# Patient Record
Sex: MALE | Race: WHITE | NOT HISPANIC OR LATINO
[De-identification: names, ages, dates, MRNs, and addresses within clinical notes are randomized per-mention and may not be internally consistent; named-entity substitution may affect disease eponyms.]

---
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
## 2021-08-15 DIAGNOSIS — Z01.818 ENCOUNTER FOR OTHER PREPROCEDURAL EXAMINATION: ICD-10-CM

## 2021-08-16 ENCOUNTER — APPOINTMENT (OUTPATIENT)
Dept: DISASTER EMERGENCY | Facility: CLINIC | Age: 21
End: 2021-08-16

## 2021-08-17 LAB — SARS-COV-2 N GENE NPH QL NAA+PROBE: NOT DETECTED

## 2022-04-07 ENCOUNTER — APPOINTMENT (OUTPATIENT)
Dept: FAMILY MEDICINE | Facility: CLINIC | Age: 22
End: 2022-04-07
Payer: MEDICAID

## 2022-04-07 VITALS
DIASTOLIC BLOOD PRESSURE: 75 MMHG | HEIGHT: 68 IN | SYSTOLIC BLOOD PRESSURE: 118 MMHG | BODY MASS INDEX: 19.25 KG/M2 | WEIGHT: 127 LBS | HEART RATE: 67 BPM | OXYGEN SATURATION: 99 % | TEMPERATURE: 97.6 F

## 2022-04-07 DIAGNOSIS — M67.88 OTHER SPECIFIED DISORDERS OF SYNOVIUM AND TENDON, OTHER SITE: ICD-10-CM

## 2022-04-07 PROCEDURE — 99395 PREV VISIT EST AGE 18-39: CPT

## 2022-04-07 NOTE — HISTORY OF PRESENT ILLNESS
[de-identified] : 23yo male complains of pain in his L achilles after a basketball game a few weeks ago. He denies weakness or change in sensation. Otherwise, he feels well, no complaints. He is not sexually active, drinks alchol infrequently and vapes daily. Discussed the risks of vaping, he states he has cut down significantly and uses 1 vape per month. He intends to quit. He is currently working and will return to school in the fall to pursue his masters in accounting.

## 2022-04-07 NOTE — ASSESSMENT
[FreeTextEntry1] : 23 yo male for CPE \par \par 1) CPE: bloodwork ordered, refused STI testing, counselled on vaping and ways to quit, patient will pick a quit date and cut down leading up to that date\par 2) L ankle pain: likely tendinosis of achilles tendon, recommend PT

## 2022-04-07 NOTE — HEALTH RISK ASSESSMENT
[With Family] : lives with family [None] : None [Sexually Active] : not sexually active [High Risk Behavior] : no high risk behavior [Smoke Detector] : smoke detector [Seat Belt] :  uses seat belt

## 2022-06-30 ENCOUNTER — APPOINTMENT (OUTPATIENT)
Dept: FAMILY MEDICINE | Facility: CLINIC | Age: 22
End: 2022-06-30

## 2022-06-30 VITALS
SYSTOLIC BLOOD PRESSURE: 114 MMHG | BODY MASS INDEX: 19.7 KG/M2 | HEART RATE: 55 BPM | DIASTOLIC BLOOD PRESSURE: 70 MMHG | OXYGEN SATURATION: 100 % | WEIGHT: 130 LBS | RESPIRATION RATE: 14 BRPM | HEIGHT: 68 IN | TEMPERATURE: 98 F

## 2022-06-30 DIAGNOSIS — S90.122D CONTUSION OF LEFT LESSER TOE(S) W/OUT DAMAGE TO NAIL, SUBSEQUENT ENCOUNTER: ICD-10-CM

## 2022-06-30 PROCEDURE — 99212 OFFICE O/P EST SF 10 MIN: CPT

## 2022-07-05 NOTE — HISTORY OF PRESENT ILLNESS
[FreeTextEntry8] : \par Patient presents with cc left great toe nontender bruising that started after playing basketball. (2-3 weeks ago.)\par He obtained new shoes one month ago and notes that they may be a bit tight. \par Patient reports that he previously had an infection in his right great toe that had to be drained in urgent care. \par

## 2022-07-05 NOTE — PLAN
[FreeTextEntry1] : \par Traumatic ecchymosis left great toe (nontender):\par Importance of wearing properly fitted shoes discussed\par Will avoid exercises that will cause recurrent trauma at this time\par Podiatry eval\par \par Patient will call with any concerns

## 2022-07-05 NOTE — PHYSICAL EXAM
[Normal] : no acute distress, well nourished, well developed and well-appearing [No Respiratory Distress] : no respiratory distress  [No Accessory Muscle Use] : no accessory muscle use [Clear to Auscultation] : lungs were clear to auscultation bilaterally [Normal Rate] : normal rate  [Regular Rhythm] : with a regular rhythm [Normal S1, S2] : normal S1 and S2 [Pedal Pulses Present] : the pedal pulses are present [No Edema] : there was no peripheral edema [Coordination Grossly Intact] : coordination grossly intact [Normal Gait] : normal gait [Normal Affect] : the affect was normal [Normal Insight/Judgement] : insight and judgment were intact [de-identified] : Ecchymosis of dorsal left hallux associated with ecchymosis under nail

## 2023-03-01 ENCOUNTER — EMERGENCY (EMERGENCY)
Facility: HOSPITAL | Age: 23
LOS: 1 days | Discharge: HOME | End: 2023-03-01
Attending: EMERGENCY MEDICINE | Admitting: EMERGENCY MEDICINE
Payer: MEDICAID

## 2023-03-01 VITALS
HEART RATE: 62 BPM | OXYGEN SATURATION: 98 % | RESPIRATION RATE: 16 BRPM | TEMPERATURE: 99 F | SYSTOLIC BLOOD PRESSURE: 98 MMHG | DIASTOLIC BLOOD PRESSURE: 53 MMHG | WEIGHT: 130.07 LBS | HEIGHT: 70 IN

## 2023-03-01 LAB
APPEARANCE UR: CLEAR — SIGNIFICANT CHANGE UP
BILIRUB UR-MCNC: NEGATIVE — SIGNIFICANT CHANGE UP
COLOR SPEC: YELLOW — SIGNIFICANT CHANGE UP
DIFF PNL FLD: NEGATIVE — SIGNIFICANT CHANGE UP
GLUCOSE UR QL: NEGATIVE — SIGNIFICANT CHANGE UP
KETONES UR-MCNC: NEGATIVE — SIGNIFICANT CHANGE UP
LEUKOCYTE ESTERASE UR-ACNC: NEGATIVE — SIGNIFICANT CHANGE UP
NITRITE UR-MCNC: NEGATIVE — SIGNIFICANT CHANGE UP
PH UR: 6 — SIGNIFICANT CHANGE UP (ref 5–8)
PROT UR-MCNC: 15
SP GR SPEC: 1.02 — SIGNIFICANT CHANGE UP (ref 1.01–1.02)
UROBILINOGEN FLD QL: NEGATIVE — SIGNIFICANT CHANGE UP

## 2023-03-01 PROCEDURE — 76870 US EXAM SCROTUM: CPT | Mod: 26

## 2023-03-01 PROCEDURE — 99284 EMERGENCY DEPT VISIT MOD MDM: CPT

## 2023-03-01 RX ORDER — ALPRAZOLAM 0.25 MG
0.5 TABLET ORAL ONCE
Refills: 0 | Status: DISCONTINUED | OUTPATIENT
Start: 2023-03-01 | End: 2023-03-01

## 2023-03-01 RX ORDER — IBUPROFEN 200 MG
600 TABLET ORAL ONCE
Refills: 0 | Status: COMPLETED | OUTPATIENT
Start: 2023-03-01 | End: 2023-03-01

## 2023-03-01 RX ADMIN — Medication 0.5 MILLIGRAM(S): at 21:16

## 2023-03-01 RX ADMIN — Medication 600 MILLIGRAM(S): at 21:19

## 2023-03-01 NOTE — ED ADULT TRIAGE NOTE - CHIEF COMPLAINT QUOTE
c/o R sided testicular pain since this morning. sent in by [facility] for US. denies swelling, fevers, n/v, abd pain, injury to the area. denies pmhx.

## 2023-03-01 NOTE — ED ADULT NURSE NOTE - CHIEF COMPLAINT QUOTE
c/o R sided testicular pain since this morning. sent in by [facility] for US. denies swelling, fevers, n/v, abd pain, injury to the area. denies pmhx.

## 2023-03-01 NOTE — ED ADULT TRIAGE NOTE - BP NONINVASIVE SYSTOLIC (MM HG)
Encounter Date: 1/30/2018    SCRIBE #1 NOTE: I, Sierra Hammond, am scribing for, and in the presence of,  Dr. Choi. I have scribed the entire note.       History     Chief Complaint   Patient presents with    Laceration     laceration to L thumb. bleeding controlled. Reports was repairing a window.      Time seen by provider: 6:35 PM    This is a 22 y.o. male who presents with complaint of left thumb laceration. He reports onset of symptoms was about 2 hrs ago. The patient states he was repairing a window when he cut his thumb. He notes there was immediate bleeding from the wound after the injury. The bleeding was controlled with a compress. He denies any other wounds. The patient denies any light headedness, dizziness, numbness, tingling or weakness in the left hand. He denies any history of bleeding disorder or being on blood thinners. The patient is not up to date on Tetanus      The history is provided by the patient.     Review of patient's allergies indicates:  No Known Allergies  History reviewed. No pertinent past medical history.  History reviewed. No pertinent surgical history.  History reviewed. No pertinent family history.  Social History   Substance Use Topics    Smoking status: Current Every Day Smoker     Packs/day: 1.00    Smokeless tobacco: Never Used    Alcohol use Yes     Review of Systems   Skin: Positive for wound (left thumb laceration).   All other systems reviewed and are negative.      Physical Exam     Initial Vitals [01/30/18 1802]   BP Pulse Resp Temp SpO2   132/60 85 16 98.1 °F (36.7 °C) 99 %      MAP       84         Physical Exam    Nursing note and vitals reviewed.  Constitutional: He appears well-developed and well-nourished. He is not diaphoretic. No distress.   HENT:   Head: Normocephalic and atraumatic.   Right Ear: External ear normal.   Left Ear: External ear normal.   Eyes: Conjunctivae and EOM are normal.   Neck: Normal range of motion. Neck supple.   Cardiovascular:  Normal rate, regular rhythm, normal heart sounds and intact distal pulses. Exam reveals no gallop and no friction rub.    No murmur heard.  Pulmonary/Chest: Breath sounds normal. No respiratory distress. He has no wheezes. He has no rhonchi. He has no rales.   Musculoskeletal: Normal range of motion. He exhibits no edema or tenderness.   Left hand is neurovascularly intact   Lymphadenopathy:     He has no cervical adenopathy.   Neurological: He is alert and oriented to person, place, and time. He has normal strength.   Skin: Skin is warm and dry. No rash noted.   2 cm palmar surface of the left thumb         ED Course   Lac Repair  Date/Time: 1/30/2018 6:43 PM  Performed by: ANTHONY CROW  Authorized by: ANTHONY CROW   Consent Done: Emergent Situation  Body area: upper extremity  Location details: left thumb  Laceration length: 2 cm  Foreign bodies: no foreign bodies  Tendon involvement: none  Nerve involvement: none  Vascular damage: no  Anesthesia: digital block    Anesthesia:  Local Anesthetic: lidocaine 1% without epinephrine  Anesthetic total: 2 mL  Patient sedated: no  Preparation: Patient was prepped and draped in the usual sterile fashion.  Irrigation solution: saline  Irrigation method: syringe  Amount of cleaning: standard  Debridement: none  Degree of undermining: none  Skin closure: Ethilon (5-0)  Number of sutures: 4  Technique: simple (interrupted)  Approximation: close  Approximation difficulty: simple  Dressing: antibiotic ointment and non-stick sterile dressing  Patient tolerance: Patient tolerated the procedure well with no immediate complications        Labs Reviewed - No data to display                       Attending Attestation:           Physician Attestation for Scribe:  Physician Attestation Statement for Scribe #1: I, Anthony Crow, reviewed documentation, as scribed by Sierra Hammond in my presence, and it is both accurate and complete.                 ED Course    lac repaired  without difficulty. Will dc to f/u w pmd in 7 days for suture removal  Clinical Impression:     1. Laceration of left thumb without foreign body without damage to nail, initial encounter        Disposition:   Disposition: Discharged  Condition: Stable                      Jerod Choi MD  01/30/18 1919     98

## 2023-03-01 NOTE — ED PROVIDER NOTE - NS ED ATTENDING STATEMENT MOD
This was a shared visit with the MARCELLO. I reviewed and verified the documentation and independently performed the documented:

## 2023-03-01 NOTE — ED ADULT NURSE NOTE - OBJECTIVE STATEMENT
Pt presented to ED c/o sudden onset right testicular pain started approx. 8 hours ago.  Pt denies any traumatic event to area.  No swelling or redness noted.  Denies any chest pain or SOB.  No n/v/d.  No urinary symptoms noted.  Pending urine symptoms and ultrasound.  Maintain comfort and safety.

## 2023-03-01 NOTE — ED PROVIDER NOTE - CARE PROVIDER_API CALL
Curt Stanton  UROLOGY  [address], [address]  [place] [address]  Phone: [phone]  Fax: [phone]  Follow Up Time:

## 2023-03-01 NOTE — ED PROVIDER NOTE - OBJECTIVE STATEMENT
23 yo male with no significant pmh presents to the ED c/o right sided testicular pain x 8 hrs. [place] symptoms [place]. No trauma to area. patient went to , then set to ED for further evaluation/treatment. Pain is intermittent, no pain at rest, worse with movement and palpation. Denies fever, chills, chest pain, sob, abd pain, N/V, urinary sxs, hematuria, penile discharge. States he is not sexually active, no concern for STD. no previus similar episodes of pain.

## 2023-03-01 NOTE — ED PROVIDER NOTE - CLINICAL SUMMARY MEDICAL DECISION MAKING FREE TEXT BOX
23 yo M p/w 8 hrs of  right testicular pain  vss  pe unremarkable    will get us, sti panel, US sono, reassess

## 2023-03-01 NOTE — ED PROVIDER NOTE - NPI NUMBER (FOR SYSADMIN USE ONLY) :
Drug Administration Record    Prior to injection, verified patient identity using patient's name and date of birth.  Due to injection administration, patient instructed to remain in clinic for 15 minutes  afterwards, and to report any adverse reaction to me immediately.    Drug Name: candida antigen  Dose: 0.5mL of candida antigen  Route administered: ID  NDC #: [id number]: Candida ([id number])  Amount of waste(mL):0.5  Reason for waste: Multi dose vial used as single use    LOT #:   SITE: see note  :[facility]  EXPIRATION DATE: 1/23/2021     [[id number]]

## 2023-03-01 NOTE — ED PROVIDER NOTE - NSDCPRINTRESULTS_ED_ALL_ED
Doppler of bilateral legs 115, strong pulses   Patient requests all Lab, Cardiology, and Radiology Results on their Discharge Instructions

## 2023-03-01 NOTE — ED PROVIDER NOTE - PATIENT PORTAL LINK FT
You can access the FollowMyHealth Patient Portal offered by [facility] by registering at the following website: [email or web address]. By joining [facility]’s FollowMyHealth portal, you will also be able to view your health information using other applications (apps) compatible with our system.

## 2023-03-02 VITALS
HEART RATE: 76 BPM | RESPIRATION RATE: 18 BRPM | SYSTOLIC BLOOD PRESSURE: 102 MMHG | OXYGEN SATURATION: 97 % | TEMPERATURE: 99 F | DIASTOLIC BLOOD PRESSURE: 68 MMHG

## 2023-03-02 LAB
C TRACH RRNA SPEC QL NAA+PROBE: SIGNIFICANT CHANGE UP
CULTURE RESULTS: SIGNIFICANT CHANGE UP
N GONORRHOEA RRNA SPEC QL NAA+PROBE: SIGNIFICANT CHANGE UP
SPECIMEN SOURCE: SIGNIFICANT CHANGE UP
SPECIMEN SOURCE: SIGNIFICANT CHANGE UP

## 2023-03-02 PROCEDURE — 87086 URINE CULTURE/COLONY COUNT: CPT

## 2023-03-02 PROCEDURE — 99284 EMERGENCY DEPT VISIT MOD MDM: CPT | Mod: 25

## 2023-03-02 PROCEDURE — 87491 CHLMYD TRACH DNA AMP PROBE: CPT

## 2023-03-02 PROCEDURE — 87591 N.GONORRHOEAE DNA AMP PROB: CPT

## 2023-03-02 PROCEDURE — 81001 URINALYSIS AUTO W/SCOPE: CPT

## 2023-03-02 PROCEDURE — 76870 US EXAM SCROTUM: CPT

## 2023-03-02 RX ORDER — IBUPROFEN 200 MG
600 TABLET ORAL ONCE
Refills: 0 | Status: COMPLETED | OUTPATIENT
Start: 2023-03-02 | End: 2023-03-02

## 2023-03-02 RX ADMIN — Medication 600 MILLIGRAM(S): at 00:30

## 2023-03-02 NOTE — ED ADULT NURSE REASSESSMENT NOTE - NS ED NURSE REASSESS COMMENT FT1
Pt to be dc'd home and follow up out pt with Urology.  Pt states he is feeling much better at this time and is agreeable to go home.  Pt expressed understanding of dc instructions.

## 2023-03-14 ENCOUNTER — APPOINTMENT (OUTPATIENT)
Dept: FAMILY MEDICINE | Facility: CLINIC | Age: 23
End: 2023-03-14
Payer: MEDICAID

## 2023-03-14 VITALS
WEIGHT: 130 LBS | DIASTOLIC BLOOD PRESSURE: 50 MMHG | TEMPERATURE: 98.3 F | SYSTOLIC BLOOD PRESSURE: 104 MMHG | HEIGHT: 70 IN | HEART RATE: 64 BPM | BODY MASS INDEX: 18.61 KG/M2 | OXYGEN SATURATION: 100 %

## 2023-03-14 PROCEDURE — 99213 OFFICE O/P EST LOW 20 MIN: CPT

## 2023-03-14 NOTE — PHYSICAL EXAM
[No Acute Distress] : no acute distress [Normal Sclera/Conjunctiva] : normal sclera/conjunctiva [Normal Outer Ear/Nose] : the outer ears and nose were normal in appearance [No Respiratory Distress] : no respiratory distress  [No Accessory Muscle Use] : no accessory muscle use [Clear to Auscultation] : lungs were clear to auscultation bilaterally [Normal Rate] : normal rate  [Regular Rhythm] : with a regular rhythm [Normal S1, S2] : normal S1 and S2 [No Edema] : there was no peripheral edema [Soft] : abdomen soft [Non Tender] : non-tender [Non-distended] : non-distended [No Joint Swelling] : no joint swelling [Grossly Normal Strength/Tone] : grossly normal strength/tone [No Rash] : no rash [Normal Gait] : normal gait [Alert and Oriented x3] : oriented to person, place, and time [de-identified] : No TTP of BALAJI bey

## 2023-03-14 NOTE — REVIEW OF SYSTEMS
[Fever] : no fever [Chills] : no chills [Chest Pain] : no chest pain [Palpitations] : no palpitations [Lower Ext Edema] : no lower extremity edema [Orthopena] : no orthopnea [Shortness Of Breath] : no shortness of breath [Abdominal Pain] : no abdominal pain [Nausea] : no nausea [Vomiting] : no vomiting [Melena] : no melena [Dysuria] : no dysuria [Incontinence] : no incontinence [Hematuria] : no hematuria [Frequency] : no frequency [Joint Pain] : no joint pain [Muscle Pain] : no muscle pain [Muscle Weakness] : no muscle weakness [Itching] : no itching [Skin Rash] : no skin rash [Headache] : no headache [Dizziness] : no dizziness

## 2023-03-14 NOTE — HISTORY OF PRESENT ILLNESS
[FreeTextEntry8] : 21 yo M w/ PMH of [place] Schlatter comes in with recent R sided groin pain. [facility]. Pt was then sent to [facility] for sonogram which he states were normal. Pt then saw urologist which he states the workup was all normal too. Pt states that the pain has been resolved for the last 5 days. Pt made todays appt before workup was done at [facility] and urologist. Pt states that the pain was only 4/10, however felt a lot of discomfort as it was limiting pt's normal movement. Pt used ibuprofen 200mg along with ice packs with improvement. Pt reports playing basketball the day before. Denies trauma. However, pt currently has no complaints and is not in any pain/discomfort.

## 2023-03-14 NOTE — PLAN
[FreeTextEntry1] : #Recent R. groin muscle strain\par - Recent visit to urgent care, [facility], and urology for R.groin pain\par - Workup was negative according to pt \par - Pt's pain/discomfort has now been resolved for several days\par - Pt advised to used OTC medications along w/ ice packs if pain comes back \par - No imaging warranted at this time

## 2023-04-19 NOTE — ED PROVIDER NOTE - GENITOURINARY SCROTUM
CC:  Danielle Sanchez is here today for Transitional Care Management (Hospital Discharge Follow-Up)       Medications have been reviewed and updated    Patient preferred phone number: [phone]  Ok to leave detailed message on voicemail    Health Maintenance Due   Topic Date Due   • Shingles Vaccine (1 of 2) Never done   • DTaP/Tdap/Td Vaccine (2 - Td or Tdap) 11/28/2022   • Traditional Medicare- Medicare Wellness Visit  09/27/2023       Patient is due for topics as listed above but is not proceeding with Immunization(s) Dtap/Tdap/Td and Shingles at this time.      normal appearing scrotum. no mass, no swelling, no lesions/ulcerations.

## 2023-06-08 NOTE — REVIEW OF SYSTEMS
[Negative] : Constitutional [FreeTextEntry9] : see hpi Topical Clindamycin Counseling: Patient counseled that this medication may cause skin irritation or allergic reactions.  In the event of skin irritation, the patient was advised to reduce the amount of the drug applied or use it less frequently.   The patient verbalized understanding of the proper use and possible adverse effects of clindamycin.  All of the patient's questions and concerns were addressed.

## 2025-09-11 ENCOUNTER — APPOINTMENT (OUTPATIENT)
Dept: FAMILY MEDICINE | Facility: CLINIC | Age: 25
End: 2025-09-11
Payer: MEDICAID

## 2025-09-11 VITALS
WEIGHT: 127 LBS | HEIGHT: 70 IN | TEMPERATURE: 98.6 F | SYSTOLIC BLOOD PRESSURE: 90 MMHG | DIASTOLIC BLOOD PRESSURE: 70 MMHG | HEART RATE: 84 BPM | OXYGEN SATURATION: 98 % | BODY MASS INDEX: 18.18 KG/M2

## 2025-09-11 VITALS — SYSTOLIC BLOOD PRESSURE: 102 MMHG | DIASTOLIC BLOOD PRESSURE: 60 MMHG

## 2025-09-11 DIAGNOSIS — Z13.1 ENCOUNTER FOR SCREENING FOR DIABETES MELLITUS: ICD-10-CM

## 2025-09-11 DIAGNOSIS — Z00.00 ENCOUNTER FOR GENERAL ADULT MEDICAL EXAMINATION W/OUT ABNORMAL FINDINGS: ICD-10-CM

## 2025-09-11 DIAGNOSIS — Z72.0 TOBACCO USE: ICD-10-CM

## 2025-09-11 DIAGNOSIS — Z13.220 ENCOUNTER FOR SCREENING FOR LIPOID DISORDERS: ICD-10-CM

## 2025-09-11 DIAGNOSIS — Z13.29 ENCOUNTER FOR SCREENING FOR OTHER SUSPECTED ENDOCRINE DISORDER: ICD-10-CM

## 2025-09-11 PROCEDURE — 99395 PREV VISIT EST AGE 18-39: CPT | Mod: GC
